# Patient Record
Sex: MALE | NOT HISPANIC OR LATINO | Employment: STUDENT | URBAN - METROPOLITAN AREA
[De-identification: names, ages, dates, MRNs, and addresses within clinical notes are randomized per-mention and may not be internally consistent; named-entity substitution may affect disease eponyms.]

---

## 2022-10-26 ENCOUNTER — OFFICE VISIT (OUTPATIENT)
Dept: URGENT CARE | Facility: URGENT CARE | Age: 22
End: 2022-10-26
Payer: COMMERCIAL

## 2022-10-26 VITALS
WEIGHT: 120 LBS | BODY MASS INDEX: 19.29 KG/M2 | SYSTOLIC BLOOD PRESSURE: 122 MMHG | TEMPERATURE: 99.9 F | HEIGHT: 66 IN | OXYGEN SATURATION: 96 % | HEART RATE: 108 BPM | RESPIRATION RATE: 16 BRPM | DIASTOLIC BLOOD PRESSURE: 72 MMHG

## 2022-10-26 DIAGNOSIS — J02.0 STREP PHARYNGITIS: Primary | ICD-10-CM

## 2022-10-26 DIAGNOSIS — R13.10 ODYNOPHAGIA: ICD-10-CM

## 2022-10-26 LAB — DEPRECATED S PYO AG THROAT QL EIA: POSITIVE

## 2022-10-26 PROCEDURE — 99203 OFFICE O/P NEW LOW 30 MIN: CPT | Mod: 25 | Performed by: EMERGENCY MEDICINE

## 2022-10-26 PROCEDURE — 87880 STREP A ASSAY W/OPTIC: CPT | Performed by: EMERGENCY MEDICINE

## 2022-10-26 PROCEDURE — 96372 THER/PROPH/DIAG INJ SC/IM: CPT | Performed by: EMERGENCY MEDICINE

## 2022-10-26 RX ORDER — ACETAMINOPHEN 500 MG
500 TABLET ORAL ONCE
Status: COMPLETED | OUTPATIENT
Start: 2022-10-26 | End: 2022-10-26

## 2022-10-26 RX ORDER — ACETAMINOPHEN 500 MG
500-1000 TABLET ORAL EVERY 6 HOURS PRN
Qty: 30 TABLET | Refills: 0 | Status: SHIPPED | OUTPATIENT
Start: 2022-10-26

## 2022-10-26 RX ORDER — DEXAMETHASONE SODIUM PHOSPHATE 10 MG/ML
10 INJECTION INTRAMUSCULAR; INTRAVENOUS ONCE
Status: COMPLETED | OUTPATIENT
Start: 2022-10-26 | End: 2022-10-26

## 2022-10-26 RX ADMIN — DEXAMETHASONE SODIUM PHOSPHATE 10 MG: 10 INJECTION INTRAMUSCULAR; INTRAVENOUS at 20:04

## 2022-10-26 RX ADMIN — Medication 500 MG: at 19:59

## 2022-10-27 NOTE — PROGRESS NOTES
Assessment & Plan     Diagnosis:    (J02.0) Strep pharyngitis  (primary encounter diagnosis)  Plan: Streptococcus A Rapid Screen w/Reflex to PCR -         Clinic Collect, acetaminophen (TYLENOL) 500 MG         tablet, acetaminophen (TYLENOL) tablet 500 mg,         penicillin G benzathine (BICILLIN L-A)         injection 1.2 Million Units    (R13.10) Odynophagia      Medical Decision Making:  Kirk Smith is a 22 year old male who presents with sore throat and clinical evidence of pharyngitis.  The rapid strep test is positive. I see no clinical evidence of  peritonsillar abscess, retropharyngeal abscess, Lemierre's Syndrome, epiglottis, or Charly's angina. The patient's symptoms are consistent with streptococcal pharyngitis.  Uvula midline.  Non-toxic appearing.  No drooling.  No stridor. I have recommended treatment with antibiotics; he already started a 10 day course of Augmentin today but he prefers the Bicillin injection; this was given -- stop the Augmentin.  Go to the ER if increasing pain, change in voice, neck pain, vomiting, fever, or shortness of breath. Follow-up with primary physician if not improving in 3-5 days. Patient verbalized understanding and agreement with the plan.    Bang Abdul PA-C  [facility]    Subjective     Kirk Smith is a 22 year old male who presents to clinic today for the following health issues:  Chief Complaint   Patient presents with     Urgent Care     Pharyngitis     Since Monday, sore throat and getting worsening, chill. Pt has e-visit today, Pt was prescribed amoxicillin.        HPI    Onset of symptoms was 2 day(s) ago.  Course of illness is worsening.    Severity: moderately severe  Current and Associated symptoms: fever, sore throat and hoarse voice, body aches, not eating; states that it hurts to swallow even water or saliva.  Denies stuffy nose, cough - non-productive, vomiting, diarrhea, taking in fluids?  Yes  Treatment  "measures tried include Tylenol/Ibuprofen. Also started Augmentin today after an E-visit.   Predisposing factors include None      Review of Systems    See HPI    Objective      Vitals: /72   Pulse 108   Temp 99.9  F (37.7  C) (Temporal)   Resp 16   Ht 1.676 m (5' 6\")   Wt 54.4 kg (120 lb)   SpO2 96%   BMI 19.37 kg/m        Vital signs reviewed by: Bang Abdul PA-C    Physical Exam   Constitutional: Alert and active. Mild acute distress.  HENT:   Right Ear: External ear normal. TM: gray/pale appearing  Left Ear: External ear normal. TM: gray/pale appearing.  Nose: Nose normal.    Eyes: Conjunctivae, EOM and lids are normal.   Mouth: Mucous membranes are moist. Posterior oropharynx is erythematous. Exudates not present. Tonsils are symmetrically enlarged. Uvula is midline. No submandibular swelling or erythema.  Neck: Normal ROM. No meningismus. Mild anterior cervical lymphadenopathy noted bilaterally. No posterior chain cervical lymphadenopathy noted.  Cardiovascular: Regular rate and rhythm  Pulmonary/Chest: Effort normal. No respiratory distress. Lungs are clear to auscultation throughout.  GI: Abdomen is soft and non-tender throughout. No hepatosplenomegaly.  Musculoskeletal: Normal range of motion.   Neurological: Alert and appropriately oriented for age.   Skin: No rash noted on visualized skin.       Labs/Imaging:  Results for orders placed or performed in visit on 10/26/22   Streptococcus A Rapid Screen w/Reflex to PCR - Clinic Collect     Status: Abnormal    Specimen: Throat; Swab   Result Value Ref Range    Group A Strep antigen Positive (A) Negative       Interventions:    Decadron 10mg PO  Tylenol 500mg PO  Bicillin 1.2 million units IM    Bang Abdul PA-C, October 26, 2022                        "

## 2023-02-12 ENCOUNTER — HEALTH MAINTENANCE LETTER (OUTPATIENT)
Age: 23
End: 2023-02-12

## 2024-03-10 ENCOUNTER — HEALTH MAINTENANCE LETTER (OUTPATIENT)
Age: 24
End: 2024-03-10

## 2025-03-16 ENCOUNTER — HEALTH MAINTENANCE LETTER (OUTPATIENT)
Age: 25
End: 2025-03-16